# Patient Record
Sex: MALE | Race: WHITE | NOT HISPANIC OR LATINO | Employment: FULL TIME | URBAN - METROPOLITAN AREA
[De-identification: names, ages, dates, MRNs, and addresses within clinical notes are randomized per-mention and may not be internally consistent; named-entity substitution may affect disease eponyms.]

---

## 2023-06-28 ENCOUNTER — TELEPHONE (OUTPATIENT)
Dept: PRIMARY CARE | Facility: CLINIC | Age: 53
End: 2023-06-28

## 2023-06-28 ENCOUNTER — OFFICE VISIT (OUTPATIENT)
Dept: PRIMARY CARE | Facility: CLINIC | Age: 53
End: 2023-06-28
Payer: COMMERCIAL

## 2023-06-28 VITALS
HEART RATE: 69 BPM | DIASTOLIC BLOOD PRESSURE: 72 MMHG | HEIGHT: 70 IN | WEIGHT: 253 LBS | BODY MASS INDEX: 36.22 KG/M2 | SYSTOLIC BLOOD PRESSURE: 120 MMHG | RESPIRATION RATE: 18 BRPM | OXYGEN SATURATION: 95 %

## 2023-06-28 DIAGNOSIS — S16.1XXD STRAIN OF NECK MUSCLE, SUBSEQUENT ENCOUNTER: ICD-10-CM

## 2023-06-28 DIAGNOSIS — M54.16 LUMBAR RADICULOPATHY: Primary | ICD-10-CM

## 2023-06-28 PROBLEM — M54.2 NECK PAIN: Status: ACTIVE | Noted: 2023-06-28

## 2023-06-28 PROBLEM — F43.20 REACTION, SITUATIONAL: Status: ACTIVE | Noted: 2023-06-28

## 2023-06-28 PROBLEM — E78.5 HYPERLIPEMIA: Status: ACTIVE | Noted: 2023-06-28

## 2023-06-28 PROBLEM — M62.08 DIASTASIS RECTI: Status: ACTIVE | Noted: 2023-06-28

## 2023-06-28 PROBLEM — R25.2 MUSCLE CRAMPING: Status: ACTIVE | Noted: 2023-06-28

## 2023-06-28 PROBLEM — I10 BENIGN HYPERTENSION: Status: ACTIVE | Noted: 2023-06-28

## 2023-06-28 PROBLEM — J30.9 ALLERGIC RHINITIS: Status: ACTIVE | Noted: 2023-06-28

## 2023-06-28 PROCEDURE — 3078F DIAST BP <80 MM HG: CPT | Performed by: FAMILY MEDICINE

## 2023-06-28 PROCEDURE — 3074F SYST BP LT 130 MM HG: CPT | Performed by: FAMILY MEDICINE

## 2023-06-28 PROCEDURE — 1036F TOBACCO NON-USER: CPT | Performed by: FAMILY MEDICINE

## 2023-06-28 PROCEDURE — 99213 OFFICE O/P EST LOW 20 MIN: CPT | Performed by: FAMILY MEDICINE

## 2023-06-28 RX ORDER — LOSARTAN POTASSIUM 100 MG/1
100 TABLET ORAL DAILY
COMMUNITY
End: 2024-01-17

## 2023-06-28 RX ORDER — TADALAFIL 20 MG/1
20 TABLET ORAL DAILY PRN
COMMUNITY
Start: 2020-10-14

## 2023-06-28 RX ORDER — HYDROCODONE BITARTRATE AND ACETAMINOPHEN 5; 325 MG/1; MG/1
1 TABLET ORAL EVERY 6 HOURS PRN
Qty: 12 TABLET | Refills: 0 | Status: SHIPPED | OUTPATIENT
Start: 2023-06-28 | End: 2023-06-29 | Stop reason: SDUPTHER

## 2023-06-28 RX ORDER — MELOXICAM 15 MG/1
1 TABLET ORAL DAILY
COMMUNITY
Start: 2022-09-06 | End: 2023-06-28

## 2023-06-28 RX ORDER — METHYLPREDNISOLONE 4 MG/1
TABLET ORAL
Qty: 21 TABLET | Refills: 0 | Status: SHIPPED | OUTPATIENT
Start: 2023-06-28 | End: 2023-07-05

## 2023-06-28 RX ORDER — FLUTICASONE PROPIONATE 50 MCG
2 SPRAY, SUSPENSION (ML) NASAL DAILY
COMMUNITY
Start: 2022-02-17

## 2023-06-28 ASSESSMENT — PATIENT HEALTH QUESTIONNAIRE - PHQ9
SUM OF ALL RESPONSES TO PHQ9 QUESTIONS 1 & 2: 0
2. FEELING DOWN, DEPRESSED OR HOPELESS: NOT AT ALL
1. LITTLE INTEREST OR PLEASURE IN DOING THINGS: NOT AT ALL

## 2023-06-28 ASSESSMENT — PAIN SCALES - GENERAL: PAINLEVEL: 2

## 2023-06-28 ASSESSMENT — ENCOUNTER SYMPTOMS: BACK PAIN: 1

## 2023-06-28 NOTE — PATIENT INSTRUCTIONS
I have personally reviewed the [facility] report for the patient. The report is scanned into the electronic medical record. I have considered the risks of abuse, dependence, addiction, and diversion. I believe that it is clinically appropriate for the patient to be prescribed this medication.    We discussed the increased risks of respiratory depression, including but not limited to death when combining opioid, benzodiazepines, and or sleep aids.    Starting Medrol Dosepak to help with lumbar radiculopathy.  Would like to have done with this next 48 hours.  I did write for short prescription for hydrocodone to be used just as needed at night.  Do not take this during the day could cause drowsiness.

## 2023-06-28 NOTE — PROGRESS NOTES
"Subjective   Patient ID: Grant Jarrell is a 52 y.o. male who presents for Back Pain (Since Monday; [facility]).    Back Pain      patient presents having back pain discomfort.  Patient's had discomfort in lower back.  Nothing seemed to incite this.  The pain is in the lower back is worse when he is laying down patient had significant history of back surgery in the past for lumbar disc disease.    He had been told that the back repair has been unstable by orthopedic back specialist but they have been watching this.  Pain is going into the right leg but does not go down into the foot there is been no numbness or tingling into the foot.    Patient's had no loss control bowel or bladder function.  Pain is somewhat worse with flexing forward.  Pain is in the lower back going down the posterior aspect of the right leg to the right knee but not much below the right knee.    Patient is also had some discomfort left side of the neck hurts when he is bending forward stretching it when he rotates to the right.  No numbness no tingling into the upper arm     started Monday.      Across low back inton the knee.    Worse laying down.      Felt fine.    In pain.    Surgical repair.      Review of Systems   Musculoskeletal:  Positive for back pain.       Objective   BP (!) 136/92 (BP Location: Left arm, Patient Position: Sitting, BP Cuff Size: Large adult)   Pulse 69   Resp 18   Ht 1.778 m (5' 10\")   Wt 115 kg (253 lb)   SpO2 95%   BMI 36.30 kg/m²   BSA Body surface area is 2.38 meters squared.      Physical Exam  Cardiovascular:      Rate and Rhythm: Normal rate and regular rhythm.   Pulmonary:      Effort: Pulmonary effort is normal.      Breath sounds: Normal breath sounds.   Musculoskeletal:      Cervical back: Rigidity present.   Lymphadenopathy:      Cervical: Cervical adenopathy present.   Neurological:      Motor: No weakness.     Some vertebral musculature tenderness noted cervical spine from C5-C7 on the left side.  " Some decreased range of motion with rotation to the right.  Good flexion extension noted strong shoulder shrug noted.  Deep tendon reflexes are equal and strong strong handgrip noted.    Examination of the lumbosacral spine revealed some discomfort in both sides of the lumbosacral spine from the L3-L5 level.  Some pain worsening with flexing forward with hyperextension and with rotation.    Deep tendon reflexes lower extremities are equal and strong good dorsiflexion extension of the foot noted extensor hallucis longus tendon reveals good strength monofilament testing intact  [facility] Encounter on 07/15/2022   Component Date Value Ref Range Status    Color, Urine 07/15/2022 YELLOW  STRAW,YELLOW Final    Appearance, Urine 07/15/2022 CLEAR  CLEAR Final    Specific Gravity, Urine 07/15/2022 1.025  1.005 - 1.035 Final    pH, Urine 07/15/2022 5.0  5.0 - 8.0 Final    Protein, Urine 07/15/2022 NEGATIVE  NEGATIVE mg/dL Final    Glucose, Urine 07/15/2022 NEGATIVE  NEGATIVE mg/dL Final    Blood, Urine 07/15/2022 NEGATIVE  NEGATIVE Final    Ketones, Urine 07/15/2022 NEGATIVE  NEGATIVE mg/dL Final    Bilirubin, Urine 07/15/2022 NEGATIVE  NEGATIVE Final    Urobilinogen, Urine 07/15/2022 <2.0  0.0 - 1.9 mg/dL Final    Nitrite, Urine 07/15/2022 NEGATIVE  NEGATIVE Final    Leukocyte Esterase, Urine 07/15/2022 NEGATIVE  NEGATIVE Final   [facility] Encounter on 07/15/2022   Component Date Value Ref Range Status    Glucose 07/15/2022 87  74 - 99 mg/dL Final    Sodium 07/15/2022 140  136 - 145 mmol/L Final    Potassium 07/15/2022 4.1  3.5 - 5.3 mmol/L Final    Chloride 07/15/2022 105  98 - 107 mmol/L Final    Bicarbonate 07/15/2022 28  21 - 32 mmol/L Final    Anion Gap 07/15/2022 11  10 - 20 mmol/L Final    Urea Nitrogen 07/15/2022 17  6 - 23 mg/dL Final    Creatinine 07/15/2022 0.97  0.50 - 1.30 mg/dL Final    GFR MALE 07/15/2022 >90  >90 mL/min/1.73m2 Final    Comment:  CALCULATIONS OF ESTIMATED GFR ARE PERFORMED   USING THE 2021 CKD-EPI  STUDY REFIT EQUATION   WITHOUT THE RACE VARIABLE FOR THE IDMS-TRACEABLE   CREATININE METHODS.    https://jasn.asnjournals.org/content/early/2021/09/22/ASN.8777642120      Calcium 07/15/2022 9.8  8.6 - 10.6 mg/dL Final    Albumin 07/15/2022 4.6  3.4 - 5.0 g/dL Final    Alkaline Phosphatase 07/15/2022 58  33 - 120 U/L Final    Total Protein 07/15/2022 6.9  6.4 - 8.2 g/dL Final    AST 07/15/2022 27  9 - 39 U/L Final    Total Bilirubin 07/15/2022 0.5  0.0 - 1.2 mg/dL Final    ALT (SGPT) 07/15/2022 38  10 - 52 U/L Final    Comment:  Patients treated with Sulfasalazine may generate    falsely decreased results for ALT.       Current Outpatient Medications on File Prior to Visit   Medication Sig Dispense Refill    fluticasone (Flonase Allergy Relief) 50 mcg/actuation nasal spray Administer 2 sprays into each nostril once daily.      losartan (Cozaar) 100 mg tablet Take 1 tablet (100 mg) by mouth once daily.      tadalafil 20 mg tablet Take 1 tablet (20 mg) by mouth once daily as needed.      meloxicam (Mobic) 15 mg tablet Take 1 tablet (15 mg) by mouth once daily.       No current facility-administered medications on file prior to visit.     No images are attached to the encounter.            Assessment/Plan   Problem List Items Addressed This Visit       Lumbar radiculopathy - Primary     Other Visit Diagnoses       Strain of neck muscle, subsequent encounter

## 2023-06-29 DIAGNOSIS — M54.16 LUMBAR RADICULOPATHY: ICD-10-CM

## 2023-06-29 RX ORDER — HYDROCODONE BITARTRATE AND ACETAMINOPHEN 5; 325 MG/1; MG/1
1 TABLET ORAL EVERY 6 HOURS PRN
Qty: 12 TABLET | Refills: 0 | Status: SHIPPED | OUTPATIENT
Start: 2023-06-29 | End: 2023-07-02

## 2023-09-22 ENCOUNTER — OFFICE VISIT (OUTPATIENT)
Dept: PRIMARY CARE | Facility: CLINIC | Age: 53
End: 2023-09-22
Payer: COMMERCIAL

## 2023-09-22 VITALS
SYSTOLIC BLOOD PRESSURE: 114 MMHG | HEART RATE: 68 BPM | BODY MASS INDEX: 36.88 KG/M2 | TEMPERATURE: 97.4 F | HEIGHT: 69 IN | DIASTOLIC BLOOD PRESSURE: 80 MMHG | OXYGEN SATURATION: 96 % | WEIGHT: 249 LBS

## 2023-09-22 DIAGNOSIS — F51.01 PRIMARY INSOMNIA: Primary | ICD-10-CM

## 2023-09-22 DIAGNOSIS — F43.20 ADJUSTMENT DISORDER, UNSPECIFIED TYPE: ICD-10-CM

## 2023-09-22 PROCEDURE — 99213 OFFICE O/P EST LOW 20 MIN: CPT | Performed by: FAMILY MEDICINE

## 2023-09-22 PROCEDURE — 3079F DIAST BP 80-89 MM HG: CPT | Performed by: FAMILY MEDICINE

## 2023-09-22 PROCEDURE — 1036F TOBACCO NON-USER: CPT | Performed by: FAMILY MEDICINE

## 2023-09-22 PROCEDURE — 3074F SYST BP LT 130 MM HG: CPT | Performed by: FAMILY MEDICINE

## 2023-09-22 RX ORDER — PHENYLPROPANOLAMINE/CLEMASTINE 75-1.34MG
200 TABLET, EXTENDED RELEASE ORAL AS NEEDED
COMMUNITY

## 2023-09-22 RX ORDER — TRAZODONE HYDROCHLORIDE 50 MG/1
50 TABLET ORAL NIGHTLY PRN
Qty: 30 TABLET | Refills: 0 | Status: SHIPPED | OUTPATIENT
Start: 2023-09-22 | End: 2023-10-16 | Stop reason: SDUPTHER

## 2023-09-22 ASSESSMENT — ENCOUNTER SYMPTOMS
DECREASED CONCENTRATION: 0
OCCASIONAL FEELINGS OF UNSTEADINESS: 0
LOSS OF SENSATION IN FEET: 0
NERVOUS/ANXIOUS: 0
SLEEP DISTURBANCE: 1
RESPIRATORY NEGATIVE: 1
CARDIOVASCULAR NEGATIVE: 1
AGITATION: 1
DEPRESSION: 0

## 2023-09-22 ASSESSMENT — PATIENT HEALTH QUESTIONNAIRE - PHQ9
2. FEELING DOWN, DEPRESSED OR HOPELESS: NOT AT ALL
1. LITTLE INTEREST OR PLEASURE IN DOING THINGS: SEVERAL DAYS
SUM OF ALL RESPONSES TO PHQ9 QUESTIONS 1 AND 2: 1
10. IF YOU CHECKED OFF ANY PROBLEMS, HOW DIFFICULT HAVE THESE PROBLEMS MADE IT FOR YOU TO DO YOUR WORK, TAKE CARE OF THINGS AT HOME, OR GET ALONG WITH OTHER PEOPLE: SOMEWHAT DIFFICULT

## 2023-09-22 NOTE — PATIENT INSTRUCTIONS
Treating for insomnia.    Please take trazodone at night 50 mg.  Would like to know how you are doing in the next 2 weeks.  If any side effects such as nausea headache or other change please call and let me know.    Please continue to try to exercise 40 minutes 4 times weekly.  Recommend walking in nature.    If any increased stress or anxiety or thoughts of suicide or self-harm, please call day or night.    Would recommend needing with counseling for stress management.  Please check into this through your workplace.  If it is not helpful please call and let me know so that I can help assist with this.

## 2023-09-22 NOTE — PROGRESS NOTES
"Subjective   Patient ID: Grant Jarrell is a 52 y.o. male who presents for Insomnia (Discuss stress).    HPI patient presents for issues of insomnia and also some issues of stress.  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  He has had some good things happen recently hired an assistant chief and getting this person implemented but a lot of things keep coming abdomen.    Finding it difficult time to take care of himself.  Has a lot of work and other things going on been difficult time with peaceful sleeping would like to try something to help with that and also with the stress portion of things.    He does not have much caffeine throughout the day no significant use of alcohol.  He has had no thoughts of suicide or self-harm.    He has had no troubles with abdominal pain or discomfort.  No troubles with swelling of the legs or feet.  No fever no chills no night sweats.    Has an assistant chief.    Eating healthy.    Ice tea.  Some iced tea throughout the day                Review of Systems   Respiratory: Negative.     Cardiovascular: Negative.    Psychiatric/Behavioral:  Positive for agitation and sleep disturbance. Negative for decreased concentration and suicidal ideas. The patient is not nervous/anxious.        Objective   /80   Pulse 68   Temp 36.3 °C (97.4 °F)   Ht 1.753 m (5' 9\")   Wt 113 kg (249 lb)   SpO2 96%   BMI 36.77 kg/m²   BSA Body surface area is 2.35 meters squared.      Physical Exam  Cardiovascular:      Rate and Rhythm: Normal rate and regular rhythm.      Pulses: Normal pulses.      Heart sounds: Normal heart sounds.   Pulmonary:      Effort: Pulmonary effort is normal.      Breath sounds: Normal breath sounds.   Abdominal:      General: Abdomen is flat.   Musculoskeletal:      Cervical back: Normal range of motion.   Psychiatric:         Mood and Affect: Mood normal.         Behavior: Behavior normal.         Thought Content: Thought content normal.     No " visits with results within 1 Year(s) from this visit.   Latest known visit with results is:   [facility] Encounter on 07/15/2022   Component Date Value Ref Range Status   • Color, Urine 07/15/2022 YELLOW  STRAW,YELLOW Final   • Appearance, Urine 07/15/2022 CLEAR  CLEAR Final   • Specific Gravity, Urine 07/15/2022 1.025  1.005 - 1.035 Final   • pH, Urine 07/15/2022 5.0  5.0 - 8.0 Final   • Protein, Urine 07/15/2022 NEGATIVE  NEGATIVE mg/dL Final   • Glucose, Urine 07/15/2022 NEGATIVE  NEGATIVE mg/dL Final   • Blood, Urine 07/15/2022 NEGATIVE  NEGATIVE Final   • Ketones, Urine 07/15/2022 NEGATIVE  NEGATIVE mg/dL Final   • Bilirubin, Urine 07/15/2022 NEGATIVE  NEGATIVE Final   • Urobilinogen, Urine 07/15/2022 <2.0  0.0 - 1.9 mg/dL Final   • Nitrite, Urine 07/15/2022 NEGATIVE  NEGATIVE Final   • Leukocyte Esterase, Urine 07/15/2022 NEGATIVE  NEGATIVE Final     Current Outpatient Medications on File Prior to Visit   Medication Sig Dispense Refill   • fluticasone (Flonase Allergy Relief) 50 mcg/actuation nasal spray Administer 2 sprays into each nostril once daily.     • ibuprofen (Motrin) capsule Take 1 capsule (200 mg) by mouth if needed.     • losartan (Cozaar) 100 mg tablet Take 1 tablet (100 mg) by mouth once daily.     • tadalafil 20 mg tablet Take 1 tablet (20 mg) by mouth once daily as needed.       No current facility-administered medications on file prior to visit.     No images are attached to the encounter.            Assessment/Plan

## 2023-10-16 DIAGNOSIS — F51.01 PRIMARY INSOMNIA: ICD-10-CM

## 2023-10-16 RX ORDER — TRAZODONE HYDROCHLORIDE 100 MG/1
100 TABLET ORAL NIGHTLY PRN
Qty: 30 TABLET | Refills: 0 | Status: SHIPPED | OUTPATIENT
Start: 2023-10-16 | End: 2023-11-14 | Stop reason: SDUPTHER

## 2023-11-14 DIAGNOSIS — F51.01 PRIMARY INSOMNIA: ICD-10-CM

## 2023-11-14 RX ORDER — TRAZODONE HYDROCHLORIDE 100 MG/1
100 TABLET ORAL NIGHTLY PRN
Qty: 90 TABLET | Refills: 0 | Status: SHIPPED | OUTPATIENT
Start: 2023-11-14 | End: 2024-02-09 | Stop reason: SDUPTHER

## 2024-01-17 DIAGNOSIS — I10 BENIGN HYPERTENSION: Primary | ICD-10-CM

## 2024-01-17 RX ORDER — LOSARTAN POTASSIUM 100 MG/1
100 TABLET ORAL DAILY
Qty: 90 TABLET | Refills: 1 | Status: SHIPPED | OUTPATIENT
Start: 2024-01-17

## 2024-02-09 DIAGNOSIS — F51.01 PRIMARY INSOMNIA: ICD-10-CM

## 2024-02-09 RX ORDER — TRAZODONE HYDROCHLORIDE 100 MG/1
100 TABLET ORAL NIGHTLY PRN
Qty: 90 TABLET | Refills: 0 | Status: SHIPPED | OUTPATIENT
Start: 2024-02-09 | End: 2024-05-14

## 2024-05-14 DIAGNOSIS — F51.01 PRIMARY INSOMNIA: ICD-10-CM

## 2024-05-14 RX ORDER — TRAZODONE HYDROCHLORIDE 100 MG/1
100 TABLET ORAL NIGHTLY PRN
Qty: 90 TABLET | Refills: 0 | Status: SHIPPED | OUTPATIENT
Start: 2024-05-14 | End: 2024-08-12

## 2024-06-13 DIAGNOSIS — Z87.438 HISTORY OF ERECTILE DYSFUNCTION: ICD-10-CM

## 2024-06-13 RX ORDER — TADALAFIL 20 MG/1
20 TABLET ORAL DAILY PRN
Qty: 10 TABLET | Refills: 1 | Status: SHIPPED | OUTPATIENT
Start: 2024-06-13

## 2024-07-26 DIAGNOSIS — I10 BENIGN HYPERTENSION: ICD-10-CM

## 2024-07-26 RX ORDER — LOSARTAN POTASSIUM 100 MG/1
100 TABLET ORAL DAILY
Qty: 90 TABLET | Refills: 1 | Status: SHIPPED | OUTPATIENT
Start: 2024-07-26

## 2024-08-15 DIAGNOSIS — F51.01 PRIMARY INSOMNIA: ICD-10-CM

## 2024-08-15 RX ORDER — TRAZODONE HYDROCHLORIDE 100 MG/1
100 TABLET ORAL NIGHTLY PRN
Qty: 90 TABLET | Refills: 0 | Status: SHIPPED | OUTPATIENT
Start: 2024-08-15 | End: 2024-11-13

## 2024-10-25 ENCOUNTER — APPOINTMENT (OUTPATIENT)
Dept: PRIMARY CARE | Facility: CLINIC | Age: 54
End: 2024-10-25
Payer: COMMERCIAL

## 2024-10-25 VITALS
BODY MASS INDEX: 37.03 KG/M2 | OXYGEN SATURATION: 96 % | DIASTOLIC BLOOD PRESSURE: 76 MMHG | SYSTOLIC BLOOD PRESSURE: 126 MMHG | HEART RATE: 73 BPM | HEIGHT: 69 IN | WEIGHT: 250 LBS | TEMPERATURE: 97.3 F

## 2024-10-25 DIAGNOSIS — F41.9 ANXIETY: Primary | ICD-10-CM

## 2024-10-25 PROCEDURE — 3074F SYST BP LT 130 MM HG: CPT | Performed by: FAMILY MEDICINE

## 2024-10-25 PROCEDURE — 99213 OFFICE O/P EST LOW 20 MIN: CPT | Performed by: FAMILY MEDICINE

## 2024-10-25 PROCEDURE — 3008F BODY MASS INDEX DOCD: CPT | Performed by: FAMILY MEDICINE

## 2024-10-25 PROCEDURE — 3078F DIAST BP <80 MM HG: CPT | Performed by: FAMILY MEDICINE

## 2024-10-25 PROCEDURE — 1036F TOBACCO NON-USER: CPT | Performed by: FAMILY MEDICINE

## 2024-10-25 RX ORDER — SERTRALINE HYDROCHLORIDE 50 MG/1
50 TABLET, FILM COATED ORAL DAILY
Qty: 30 TABLET | Refills: 0 | Status: SHIPPED | OUTPATIENT
Start: 2024-10-25 | End: 2024-12-24

## 2024-10-25 RX ORDER — KETOCONAZOLE 20 MG/G
CREAM TOPICAL AS NEEDED
COMMUNITY
Start: 2023-12-20

## 2024-10-25 ASSESSMENT — ENCOUNTER SYMPTOMS
FATIGUE: 0
DEPRESSION: 0
NERVOUS/ANXIOUS: 0
OCCASIONAL FEELINGS OF UNSTEADINESS: 0
UNEXPECTED WEIGHT CHANGE: 0
SLEEP DISTURBANCE: 0
POLYDIPSIA: 0
RESPIRATORY NEGATIVE: 1
EYE DISCHARGE: 0
HEADACHES: 0
ACTIVITY CHANGE: 0
SEIZURES: 0
LOSS OF SENSATION IN FEET: 0
EYE REDNESS: 1
AGITATION: 0
HALLUCINATIONS: 0
ADENOPATHY: 0

## 2024-10-25 ASSESSMENT — PATIENT HEALTH QUESTIONNAIRE - PHQ9
2. FEELING DOWN, DEPRESSED OR HOPELESS: NOT AT ALL
10. IF YOU CHECKED OFF ANY PROBLEMS, HOW DIFFICULT HAVE THESE PROBLEMS MADE IT FOR YOU TO DO YOUR WORK, TAKE CARE OF THINGS AT HOME, OR GET ALONG WITH OTHER PEOPLE: NOT DIFFICULT AT ALL
SUM OF ALL RESPONSES TO PHQ9 QUESTIONS 1 AND 2: 0
1. LITTLE INTEREST OR PLEASURE IN DOING THINGS: NOT AT ALL

## 2024-10-25 NOTE — PATIENT INSTRUCTIONS
Elevation of BMI noted reaching out to pharmacy staff to see if we might qualify for semaglutide therapy.    Given 1500-calorie diet today.    Treating for anxiety can have these meet with counseling also starting sertraline therapy.  If troubles with headache or nausea this may happen for the first 3 days and I would expect it to improve.  If not improving or any increased trouble with anxiety or depression please call and let me know she will

## 2024-10-30 ENCOUNTER — TELEMEDICINE (OUTPATIENT)
Dept: PHARMACY | Facility: HOSPITAL | Age: 54
End: 2024-10-30
Payer: COMMERCIAL

## 2024-11-15 DIAGNOSIS — F51.01 PRIMARY INSOMNIA: ICD-10-CM

## 2024-11-15 RX ORDER — TRAZODONE HYDROCHLORIDE 100 MG/1
100 TABLET ORAL NIGHTLY PRN
Qty: 90 TABLET | Refills: 0 | Status: SHIPPED | OUTPATIENT
Start: 2024-11-15 | End: 2025-02-13

## 2024-12-16 DIAGNOSIS — F41.9 ANXIETY: ICD-10-CM

## 2024-12-16 RX ORDER — SERTRALINE HYDROCHLORIDE 50 MG/1
50 TABLET, FILM COATED ORAL DAILY
Qty: 30 TABLET | Refills: 1 | Status: SHIPPED | OUTPATIENT
Start: 2024-12-16 | End: 2025-02-14

## 2024-12-31 DIAGNOSIS — I10 BENIGN HYPERTENSION: ICD-10-CM

## 2024-12-31 RX ORDER — LOSARTAN POTASSIUM 100 MG/1
100 TABLET ORAL DAILY
Qty: 90 TABLET | Refills: 1 | Status: SHIPPED | OUTPATIENT
Start: 2024-12-31

## 2025-02-09 DIAGNOSIS — F41.9 ANXIETY: ICD-10-CM

## 2025-02-10 RX ORDER — SERTRALINE HYDROCHLORIDE 50 MG/1
50 TABLET, FILM COATED ORAL DAILY
Qty: 30 TABLET | Refills: 1 | Status: SHIPPED | OUTPATIENT
Start: 2025-02-10 | End: 2025-04-11

## 2025-02-11 DIAGNOSIS — F51.01 PRIMARY INSOMNIA: ICD-10-CM

## 2025-02-11 RX ORDER — TRAZODONE HYDROCHLORIDE 100 MG/1
100 TABLET ORAL NIGHTLY PRN
Qty: 90 TABLET | Refills: 0 | Status: SHIPPED | OUTPATIENT
Start: 2025-02-11 | End: 2025-05-12

## 2025-04-08 DIAGNOSIS — F41.9 ANXIETY: ICD-10-CM

## 2025-04-08 RX ORDER — SERTRALINE HYDROCHLORIDE 50 MG/1
50 TABLET, FILM COATED ORAL DAILY
Qty: 30 TABLET | Refills: 1 | Status: SHIPPED | OUTPATIENT
Start: 2025-04-08 | End: 2025-06-07

## 2025-05-08 ASSESSMENT — ENCOUNTER SYMPTOMS
BACK PAIN: 1
WEAKNESS: 1
HEADACHES: 1

## 2025-05-09 ENCOUNTER — OFFICE VISIT (OUTPATIENT)
Dept: PRIMARY CARE | Facility: CLINIC | Age: 55
End: 2025-05-09
Payer: COMMERCIAL

## 2025-05-09 VITALS
BODY MASS INDEX: 37.33 KG/M2 | SYSTOLIC BLOOD PRESSURE: 120 MMHG | OXYGEN SATURATION: 96 % | RESPIRATION RATE: 17 BRPM | HEART RATE: 66 BPM | DIASTOLIC BLOOD PRESSURE: 70 MMHG | WEIGHT: 252.8 LBS

## 2025-05-09 DIAGNOSIS — M67.813 TENDINOSIS OF RIGHT ROTATOR CUFF: ICD-10-CM

## 2025-05-09 DIAGNOSIS — M25.511 CHRONIC RIGHT SHOULDER PAIN: Primary | ICD-10-CM

## 2025-05-09 DIAGNOSIS — G89.29 CHRONIC RIGHT SHOULDER PAIN: Primary | ICD-10-CM

## 2025-05-09 PROCEDURE — 3078F DIAST BP <80 MM HG: CPT | Performed by: NURSE PRACTITIONER

## 2025-05-09 PROCEDURE — 3074F SYST BP LT 130 MM HG: CPT | Performed by: NURSE PRACTITIONER

## 2025-05-09 PROCEDURE — 1036F TOBACCO NON-USER: CPT | Performed by: NURSE PRACTITIONER

## 2025-05-09 PROCEDURE — 99214 OFFICE O/P EST MOD 30 MIN: CPT | Performed by: NURSE PRACTITIONER

## 2025-05-09 RX ORDER — MELOXICAM 15 MG/1
15 TABLET ORAL DAILY
Qty: 90 TABLET | Refills: 0 | Status: SHIPPED | OUTPATIENT
Start: 2025-05-09 | End: 2025-08-07

## 2025-05-09 RX ORDER — HYDROCODONE BITARTRATE AND ACETAMINOPHEN 5; 325 MG/1; MG/1
1 TABLET ORAL EVERY 6 HOURS PRN
Qty: 20 TABLET | Refills: 0 | Status: SHIPPED | OUTPATIENT
Start: 2025-05-09 | End: 2025-05-16

## 2025-05-09 ASSESSMENT — COLUMBIA-SUICIDE SEVERITY RATING SCALE - C-SSRS
1. IN THE PAST MONTH, HAVE YOU WISHED YOU WERE DEAD OR WISHED YOU COULD GO TO SLEEP AND NOT WAKE UP?: NO
6. HAVE YOU EVER DONE ANYTHING, STARTED TO DO ANYTHING, OR PREPARED TO DO ANYTHING TO END YOUR LIFE?: NO
2. HAVE YOU ACTUALLY HAD ANY THOUGHTS OF KILLING YOURSELF?: NO

## 2025-05-09 ASSESSMENT — PATIENT HEALTH QUESTIONNAIRE - PHQ9
2. FEELING DOWN, DEPRESSED OR HOPELESS: NOT AT ALL
1. LITTLE INTEREST OR PLEASURE IN DOING THINGS: NOT AT ALL
SUM OF ALL RESPONSES TO PHQ9 QUESTIONS 1 AND 2: 0

## 2025-05-09 NOTE — PROGRESS NOTES
Subjective   Patient ID: Grant Jarrell is a 54 y.o. male who presents for Shoulder Pain (Right shoulder with increasing pain-neck and back ).    HPI     Patient presents for ongoing right shoulder pain. Has had pain chronically but seems to have worsened in the last 5 months when he fell on ice while carrying a ladder. He has seen Dr. Wright at [facility] and had an injection and MRI. MRI showed rotator cuff tendinosis, arthritis, and bursitis. He has also [facility]. He only had about 6 weeks of relief with the injection. Day-to-day, the pain is manageable and he takes ibuprofen, however, certain activities can exacerbate the pain; he was working on a retaining wall last weekend and afterwards had significant pain that wasn't relieved with ibuprofen 800 mg. The pain is located to the anterior aspect of his shoulder but can radiate to the posterior aspect, upper back, and neck. He reports pain with certain movements such as overhead motions.     Review of Systems  ROS negative except as noted above in HPI.     Objective   /70 (BP Location: Right arm, Patient Position: Sitting, BP Cuff Size: Large adult)   Pulse 66   Resp 17   Wt 115 kg (252 lb 12.8 oz)   SpO2 96%   BMI 37.33 kg/m²     Physical Exam  General: Alert and oriented, in no acute distress. Appears stated age, well-nourished, and well hydrated  HEENT:  - Head: Normocephalic and atraumatic   - Eyes: EOMI, PERRLA  - ENT: Hearing grossly intact  Heart: RRR. No murmurs, clicks, or rubs  Lungs: Unlabored breathing. CTAB with no crackles, wheezes, or rhonchi  Extremities: Warm and well perfused. No edema. Normal peripheral pulses  Musculoskeletal: TTP of GH joint. Positive Drop Arm Test, Empty Can Test, and Apley Scratch test. Normal gait and station  Neurological: Alert and oriented. No gross neurological deficits  Psychological: Appropriate mood and affect  Skin: No rash, abnormal lesions, cyanosis, or erythema    Assessment/Plan    Diagnoses and all orders for this visit:  Chronic right shoulder pain  Tendinosis of right rotator cuff  -     HYDROcodone-acetaminophen (Norco) 5-325 mg tablet; Take 1 tablet by mouth every 6 hours if needed for severe pain (7 - 10) for up to 7 days.  -     OARRS reviewed; discussed with patient I am only able to give an acute prescription for this  -     meloxicam (Mobic) 15 mg tablet; Take 1 tablet (15 mg) by mouth once daily.  -     Basic Metabolic Panel; Future to check kidney function due to NSAID use  -     Follow up with Dr. Wright at [facility]    Shania Portillo, FAMILIA-CNP  [facility]

## 2025-05-12 DIAGNOSIS — F51.01 PRIMARY INSOMNIA: ICD-10-CM

## 2025-05-12 RX ORDER — TRAZODONE HYDROCHLORIDE 100 MG/1
100 TABLET ORAL NIGHTLY PRN
Qty: 90 TABLET | Refills: 0 | Status: SHIPPED | OUTPATIENT
Start: 2025-05-12 | End: 2025-08-10

## 2025-05-28 LAB
ANION GAP SERPL CALCULATED.4IONS-SCNC: 9 MMOL/L (CALC) (ref 7–17)
BUN SERPL-MCNC: 12 MG/DL (ref 7–25)
BUN/CREAT SERPL: NORMAL (CALC) (ref 6–22)
CALCIUM SERPL-MCNC: 9.4 MG/DL (ref 8.6–10.3)
CHLORIDE SERPL-SCNC: 105 MMOL/L (ref 98–110)
CO2 SERPL-SCNC: 24 MMOL/L (ref 20–32)
CREAT SERPL-MCNC: 1 MG/DL (ref 0.7–1.3)
EGFRCR SERPLBLD CKD-EPI 2021: 89 ML/MIN/1.73M2
GLUCOSE SERPL-MCNC: 83 MG/DL (ref 65–99)
POTASSIUM SERPL-SCNC: 4.8 MMOL/L (ref 3.5–5.3)
SODIUM SERPL-SCNC: 138 MMOL/L (ref 135–146)

## 2025-06-06 DIAGNOSIS — F41.9 ANXIETY: ICD-10-CM

## 2025-06-06 RX ORDER — SERTRALINE HYDROCHLORIDE 50 MG/1
50 TABLET, FILM COATED ORAL DAILY
Qty: 30 TABLET | Refills: 1 | Status: SHIPPED | OUTPATIENT
Start: 2025-06-06 | End: 2025-08-05

## 2025-07-26 DIAGNOSIS — I10 BENIGN HYPERTENSION: ICD-10-CM

## 2025-07-26 RX ORDER — LOSARTAN POTASSIUM 100 MG/1
100 TABLET ORAL DAILY
Qty: 90 TABLET | Refills: 1 | Status: SHIPPED | OUTPATIENT
Start: 2025-07-26

## 2025-08-09 DIAGNOSIS — F41.9 ANXIETY: ICD-10-CM

## 2025-08-10 RX ORDER — SERTRALINE HYDROCHLORIDE 50 MG/1
50 TABLET, FILM COATED ORAL DAILY
Qty: 30 TABLET | Refills: 1 | Status: SHIPPED | OUTPATIENT
Start: 2025-08-10

## 2025-08-13 DIAGNOSIS — F51.01 PRIMARY INSOMNIA: ICD-10-CM

## 2025-08-14 RX ORDER — TRAZODONE HYDROCHLORIDE 100 MG/1
100 TABLET ORAL NIGHTLY PRN
Qty: 90 TABLET | Refills: 0 | Status: SHIPPED | OUTPATIENT
Start: 2025-08-14 | End: 2025-11-12

## 2025-08-25 DIAGNOSIS — S46.919A STRAIN OF SHOULDER, UNSPECIFIED LATERALITY, INITIAL ENCOUNTER: Primary | ICD-10-CM

## 2025-08-25 RX ORDER — METHYLPREDNISOLONE 4 MG/1
TABLET ORAL
Qty: 21 TABLET | Refills: 0 | Status: SHIPPED | OUTPATIENT
Start: 2025-08-25 | End: 2025-08-31